# Patient Record
Sex: MALE | Race: WHITE | NOT HISPANIC OR LATINO | Employment: FULL TIME | URBAN - METROPOLITAN AREA
[De-identification: names, ages, dates, MRNs, and addresses within clinical notes are randomized per-mention and may not be internally consistent; named-entity substitution may affect disease eponyms.]

---

## 2017-01-05 ENCOUNTER — OFFICE VISIT (OUTPATIENT)
Dept: FAMILY MEDICINE CLINIC | Facility: CLINIC | Age: 32
End: 2017-01-05

## 2017-01-05 VITALS
OXYGEN SATURATION: 97 % | HEIGHT: 71 IN | WEIGHT: 240 LBS | HEART RATE: 93 BPM | TEMPERATURE: 98.8 F | BODY MASS INDEX: 33.6 KG/M2 | SYSTOLIC BLOOD PRESSURE: 130 MMHG | DIASTOLIC BLOOD PRESSURE: 72 MMHG

## 2017-01-05 DIAGNOSIS — F32.9 REACTIVE DEPRESSION: ICD-10-CM

## 2017-01-05 DIAGNOSIS — G47.00 INSOMNIA, UNSPECIFIED TYPE: Primary | ICD-10-CM

## 2017-01-05 DIAGNOSIS — L30.9 DERMATITIS: ICD-10-CM

## 2017-01-05 PROCEDURE — 99213 OFFICE O/P EST LOW 20 MIN: CPT | Performed by: FAMILY MEDICINE

## 2017-01-05 RX ORDER — ESCITALOPRAM OXALATE 10 MG/1
10 TABLET ORAL NIGHTLY
Qty: 30 TABLET | Refills: 5 | Status: SHIPPED | OUTPATIENT
Start: 2017-01-05 | End: 2017-07-01 | Stop reason: SDUPTHER

## 2017-01-05 NOTE — PROGRESS NOTES
"Subjective   Olivier Trevino is a 31 y.o. male.     History of Present Illness   The patient is here for a follow up on Depression and Insomnia.    He states he is doing well.  Denies any chest pain or shortness of breath.    He states he used the Diazepam for about three nights and stopped that.  He states he did sleep better with the Escitalopram.    States he has a new job. A better job but still very stressful.    Also c/o a small area of redness on the right side of the penis when he was taking a shower.      The following portions of the patient's history were reviewed and updated as appropriate: allergies, current medications, past social history and problem list.    Review of Systems   Constitutional: Positive for fatigue. Negative for chills and fever.   HENT: Negative for congestion.    Eyes: Positive for visual disturbance (floaters).   Respiratory: Negative for shortness of breath.    Cardiovascular: Negative for chest pain.   Gastrointestinal: Negative for abdominal pain, constipation, diarrhea and nausea.   Endocrine: Negative for cold intolerance, heat intolerance, polyphagia and polyuria.   Genitourinary: Negative for difficulty urinating, discharge, frequency, penile pain and penile swelling.   Skin:        Small area of redness of the right side of the penis.   Neurological: Negative for headaches.   Psychiatric/Behavioral: Positive for sleep disturbance (insomnia).        Depression.       Objective   Visit Vitals   • /72   • Pulse 93   • Temp 98.8 °F (37.1 °C)   • Ht 71\" (180.3 cm)   • Wt 240 lb (109 kg)   • SpO2 97%   • BMI 33.47 kg/m2     Physical Exam   Constitutional: He appears well-developed and well-nourished.   Cardiovascular: Normal rate, regular rhythm and normal heart sounds.    Pulmonary/Chest: Effort normal and breath sounds normal.   Genitourinary:   Genitourinary Comments: 1 mm papule of penile shaft consistent with inflamed epithelial cyst.   Nursing note and vitals " reviewed.      Assessment/Plan   Problem List Items Addressed This Visit     None      Visit Diagnoses     Insomnia, unspecified type    -  Primary    Reactive depression        Relevant Medications    escitalopram (LEXAPRO) 10 MG tablet    Dermatitis                  Drink plenty fluids.    Refill Escitalopram 10 mg each night #30+5.    Use OTC Lotrimin cream as needed.    Follow up in 6 months for a physical exam.                        Scribed for Dr Timothy Rodriguez by Lexii Marion CMA.    I, Timothy Rodriguez MD, personally performed the services described in this documentation, as scribed by Lexii Marion in my presence, and is both accurate and complete.

## 2017-01-05 NOTE — MR AVS SNAPSHOT
Olivier Trevino   2017 3:00 PM   Office Visit    Provider:  Timothy Rodriguez MD   Department:  [facility]   Dept Phone:  [phone]                Your Full Care Plan              Today's Medication Changes          These changes are accurate as of: 17  3:48 PM.  If you have any questions, ask your nurse or doctor.               Stop taking medication(s)listed here:     diazePAM 5 MG tablet   Commonly known as:  VALIUM   Stopped by:  Timothy Rodriguez MD           escitalopram 10 MG tablet   Commonly known as:  LEXAPRO   Stopped by:  Timothy Rodriguez MD           ranitidine 300 MG capsule   Commonly known as:  ZANTAC   Stopped by:  Timothy Rodriguez MD                      Your Updated Medication List          This list is accurate as of: 17  3:48 PM.  Always use your most recent med list.                aspirin 81 MG tablet               You Were Diagnosed With        Codes Comments    Insomnia, unspecified type    -  Primary ICD-10-CM: G47.00  ICD-9-CM: 780.52       Instructions     None    Patient Instructions History      [employer] Signup     [facility] [employer] allows you to send messages to your doctor, view your test results, renew your prescriptions, schedule appointments, and more. To sign up, go to [employer] and click on the Sign Up Now link in the New User? box. Enter your [employer] Activation Code exactly as it appears below along with the last four digits of your Social Security Number and your Date of Birth () to complete the sign-up process. If you do not sign up before the expiration date, you must request a new code.    [employer] Activation Code: [email or web address]  Expires: 2017  3:48 PM    If you have questions, you can email [email or web address] or call [phone] to talk to our [employer] staff. Remember, [employer] is NOT to be used for urgent needs. For medical emergencies, dial 911.               Other  "Info from Your Visit           Allergies     No Known Allergies      Reason for Visit     Follow-up           Vital Signs     Blood Pressure Pulse Temperature Height Weight Oxygen Saturation    130/72 93 98.8 °F (37.1 °C) 71\" (180.3 cm) 240 lb (109 kg) 97%    Body Mass Index Smoking Status                33.47 kg/m2 Former Smoker          Problems and Diagnoses Noted     Difficulty falling or staying asleep    -  Primary      "

## 2017-07-03 RX ORDER — ESCITALOPRAM OXALATE 10 MG/1
TABLET ORAL
Qty: 30 TABLET | Refills: 2 | Status: SHIPPED | OUTPATIENT
Start: 2017-07-03 | End: 2017-10-02 | Stop reason: SDUPTHER

## 2017-10-02 RX ORDER — ESCITALOPRAM OXALATE 10 MG/1
TABLET ORAL
Qty: 30 TABLET | Refills: 2 | Status: SHIPPED | OUTPATIENT
Start: 2017-10-02 | End: 2018-01-05 | Stop reason: SDUPTHER

## 2018-01-05 RX ORDER — ESCITALOPRAM OXALATE 10 MG/1
TABLET ORAL
Qty: 30 TABLET | Refills: 2 | Status: SHIPPED | OUTPATIENT
Start: 2018-01-05 | End: 2018-04-09 | Stop reason: SDUPTHER

## 2018-04-11 RX ORDER — ESCITALOPRAM OXALATE 10 MG/1
TABLET ORAL
Qty: 30 TABLET | Refills: 2 | Status: SHIPPED | OUTPATIENT
Start: 2018-04-11 | End: 2019-01-19 | Stop reason: SDUPTHER

## 2019-01-20 RX ORDER — ESCITALOPRAM OXALATE 10 MG/1
TABLET ORAL
Qty: 30 TABLET | Refills: 5 | Status: SHIPPED | OUTPATIENT
Start: 2019-01-20 | End: 2019-08-12 | Stop reason: SDUPTHER

## 2019-08-12 RX ORDER — ESCITALOPRAM OXALATE 10 MG/1
TABLET ORAL
Qty: 30 TABLET | Refills: 0 | Status: SHIPPED | OUTPATIENT
Start: 2019-08-12 | End: 2019-08-19 | Stop reason: SDUPTHER

## 2019-08-19 RX ORDER — ESCITALOPRAM OXALATE 10 MG/1
TABLET ORAL
Qty: 30 TABLET | Refills: 0 | Status: SHIPPED | OUTPATIENT
Start: 2019-08-19 | End: 2023-02-21

## 2023-02-21 ENCOUNTER — OFFICE VISIT (OUTPATIENT)
Dept: FAMILY MEDICINE CLINIC | Facility: CLINIC | Age: 38
End: 2023-02-21
Payer: COMMERCIAL

## 2023-02-21 VITALS
TEMPERATURE: 97.5 F | HEART RATE: 87 BPM | DIASTOLIC BLOOD PRESSURE: 98 MMHG | WEIGHT: 233.6 LBS | SYSTOLIC BLOOD PRESSURE: 132 MMHG | BODY MASS INDEX: 33.44 KG/M2 | HEIGHT: 70 IN | OXYGEN SATURATION: 97 %

## 2023-02-21 DIAGNOSIS — J01.10 ACUTE NON-RECURRENT FRONTAL SINUSITIS: Primary | ICD-10-CM

## 2023-02-21 PROCEDURE — 99203 OFFICE O/P NEW LOW 30 MIN: CPT | Performed by: PHYSICIAN ASSISTANT

## 2023-02-21 RX ORDER — METHYLPREDNISOLONE 4 MG/1
TABLET ORAL
Qty: 21 TABLET | Refills: 0 | Status: SHIPPED | OUTPATIENT
Start: 2023-02-21

## 2023-02-21 RX ORDER — AMOXICILLIN AND CLAVULANATE POTASSIUM 875; 125 MG/1; MG/1
1 TABLET, FILM COATED ORAL 2 TIMES DAILY
Qty: 20 TABLET | Refills: 0 | Status: SHIPPED | OUTPATIENT
Start: 2023-02-21

## 2023-11-05 ENCOUNTER — HOSPITAL ENCOUNTER (EMERGENCY)
Facility: HOSPITAL | Age: 38
Discharge: HOME | End: 2023-11-05
Attending: EMERGENCY MEDICINE
Payer: COMMERCIAL

## 2023-11-05 VITALS
WEIGHT: 240 LBS | BODY MASS INDEX: 34.36 KG/M2 | RESPIRATION RATE: 18 BRPM | HEIGHT: 70 IN | HEART RATE: 100 BPM | SYSTOLIC BLOOD PRESSURE: 133 MMHG | OXYGEN SATURATION: 94 % | TEMPERATURE: 98 F | DIASTOLIC BLOOD PRESSURE: 84 MMHG

## 2023-11-05 DIAGNOSIS — R04.0 BLEEDING FROM THE NOSE: Primary | ICD-10-CM

## 2023-11-05 PROCEDURE — 99283 EMERGENCY DEPT VISIT LOW MDM: CPT

## 2023-11-05 RX ORDER — OXYMETAZOLINE HYDROCHLORIDE 0.05 G/100ML
1 SPRAY NASAL ONCE
Status: COMPLETED | OUTPATIENT
Start: 2023-11-05 | End: 2023-11-05

## 2023-11-05 RX ADMIN — OXYMETAZOLINE HCL 1 SPRAY: 0.05 SPRAY NASAL at 19:28

## 2023-11-06 NOTE — ED PROVIDER NOTES
"Subjective  History of Present Illness:    Chief Complaint: Nosebleed  History of Present Illness: 89-year-old male with nosebleed, he states that he has had congestion, sinus pain and pressure, and use of over-the-counter nasal spray, he began to have a nosebleed approximately 40 minutes prior to arrival from his left nostril  Onset: Sudden onset  Duration: Smillie 40 minutes prior to arrival  Exacerbating / Alleviating factors: Recent sinus congestion and pressure used a nasal spray  Associated symptoms: No other associated symptoms      Nurses Notes reviewed and agree, including vitals, allergies, social history and prior medical history.     REVIEW OF SYSTEMS: All systems reviewed and not pertinent unless noted.    Review of Systems   HENT:  Positive for nosebleeds.    All other systems reviewed and are negative.      Past Medical History:   Diagnosis Date    Geographic tongue        Allergies:    Patient has no known allergies.      History reviewed. No pertinent surgical history.      Social History     Socioeconomic History    Marital status: Single   Tobacco Use    Smoking status: Some Days     Packs/day: 0.50     Years: 5.00     Additional pack years: 0.00     Total pack years: 2.50     Types: Cigarettes     Passive exposure: Never    Smokeless tobacco: Never   Substance and Sexual Activity    Alcohol use: Yes     Alcohol/week: 10.0 standard drinks of alcohol     Types: 10 Cans of beer per week     Comment: week    Drug use: Yes     Types: Marijuana     Comment: once in a while    Sexual activity: Never         Family History   Problem Relation Age of Onset    Diabetes Mother     Coronary artery disease Father     Diabetes Maternal Grandfather     Coronary artery disease Paternal Grandfather        Objective  Physical Exam:  /84   Pulse 100   Temp 98 °F (36.7 °C) (Oral)   Resp 18   Ht 177.8 cm (70\")   Wt 109 kg (240 lb)   SpO2 94%   BMI 34.44 kg/m²      Physical Exam  Vitals and nursing note " reviewed.   Constitutional:       Appearance: He is well-developed.   HENT:      Head: Normocephalic and atraumatic.      Nose:      Comments: Bleeding from the anterior mucosal left nostril     Mouth/Throat:      Mouth: Mucous membranes are moist.   Eyes:      Extraocular Movements: Extraocular movements intact.   Cardiovascular:      Rate and Rhythm: Normal rate and regular rhythm.   Pulmonary:      Effort: Pulmonary effort is normal.      Breath sounds: Normal breath sounds.   Abdominal:      Palpations: Abdomen is soft.   Musculoskeletal:         General: Normal range of motion.      Cervical back: Normal range of motion.   Skin:     General: Skin is warm and dry.   Neurological:      Mental Status: He is alert and oriented to person, place, and time.   Psychiatric:         Behavior: Behavior normal.         Thought Content: Thought content normal.         Judgment: Judgment normal.           Epistaxis Management    Date/Time: 11/5/2023 8:50 PM    Performed by: Remy Gastelum Jr., PA-C  Authorized by: Olivier Gallegos MD    Consent:     Consent obtained:  Verbal    Consent given by:  Patient    Risks discussed:  Bleeding    Alternatives discussed:  No treatment  Universal protocol:     Patient identity confirmed:  Verbally with patient  Anesthesia:     Anesthesia method:  None  Procedure details:     Treatment site:  L anterior    Treatment complexity:  Limited    Treatment episode: initial    Post-procedure details:     Assessment:  Bleeding stopped    Procedure completion:  Tolerated  Comments:      Afrin nasal spray and direct pressure stop the bleeding      ED Course:    ED Course as of 11/05/23 2052   Sun Nov 05, 2023 1958 Patient's nosebleed has resolved, he was given instruction on Afrin spray, and moisturizing the mucosa with a topical generic antibiotic ointment and Q-tip a few times a day, return if symptoms worsen [CS]      ED Course User Index  [CS] Remy Gastelum Jr., PA-C       Lab  Results (last 24 hours)       ** No results found for the last 24 hours. **             No radiology results from the last 24 hrs       Medical Decision Making  Patient Presentation 37-year-old male presented with nosebleed, vital signs stable, slight elevation in his blood pressure initially likely from the nosebleed    DDX anterior nosebleed, posterior nosebleed, trauma, injury    Data Review/ Non ED Records /Analysis/Ordering unique tests no labs performed, no pertinent data review    Independent Review Studies no studies performed    Intervention/Re-evaluation intervention included removing clot from nose, Afrin spray and pressure    Independent Clinician consultation    Risk Stratification tools/clinical decision rules patient was educated on moisturizing nasal passages with topical ointment, he can continue the Afrin spray for a few days, and pressure if bleeding returns, he was stable on discharge, no acute distress    Shared Decision Making discussed this plan with the patient and he agreed with the treatment    Disposition patient stable for discharge    Problems Addressed:  Bleeding from the nose: acute illness or injury    Risk  OTC drugs.          Final diagnoses:   Bleeding from the nose          Remy Gastelum Jr., PA-C  11/05/23 2052       Remy Gastelum Jr., PA-C  11/10/23 1848

## 2023-11-07 ENCOUNTER — OFFICE VISIT (OUTPATIENT)
Dept: FAMILY MEDICINE CLINIC | Facility: CLINIC | Age: 38
End: 2023-11-07
Payer: COMMERCIAL

## 2023-11-07 VITALS
BODY MASS INDEX: 31.81 KG/M2 | OXYGEN SATURATION: 98 % | WEIGHT: 222.2 LBS | HEART RATE: 82 BPM | DIASTOLIC BLOOD PRESSURE: 64 MMHG | HEIGHT: 70 IN | TEMPERATURE: 98.6 F | SYSTOLIC BLOOD PRESSURE: 124 MMHG

## 2023-11-07 DIAGNOSIS — R04.0 EPISTAXIS: Primary | ICD-10-CM

## 2023-11-07 PROCEDURE — 99213 OFFICE O/P EST LOW 20 MIN: CPT | Performed by: PHYSICIAN ASSISTANT

## 2023-11-07 NOTE — PROGRESS NOTES
Follow Up Office Visit      Date: 2023   Patient Name: Olivier Trevino  : 1985   MRN: [id number]     Chief Complaint:    Chief Complaint   Patient presents with    Nose Bleed     3 days    Pt states that he thought he had a cold, and he was using a nasal spray. He states that he stated to feel alittle better.  He went to the urgent care and they had him blow it all out his nose they gave him another spray to use it helped the irration.  Pt then noticed today that it had started bleeding again and it was a lot       History of Present Illness: Olivier Trevino is a 38 y.o. male who is here today to follow up with nosebleeds.      Olivier Trevino date of birth 1985. Presents in clinic today for nosebleeds.     The patient reports that on 2023 he felt fatigued. He reports that he worked from home on 2023 he reports that he began pumping Zicam, following regiment. The patient reports that on 2023 in the afternoon his symptoms improved. He reports that he ran errands and around 6pm he began getting consistent epistaxis. The patient reports that he would pinch his nose for 30 minutes and the blood continued to pour out of his nose. He reports that he has gone to the emergency department. He reports having phlegm and copious amounts of blood while blowing his nose. The patient reports that he was prescribed a nasal medication to use once daily. And was informed that the Zicam irritated his sinuses. The patient reports that he will have bleeding out of his left side. The patient reports that he has not spit up any blood. He reports that he can feel it run down his throat. The patient reports that a week prior he felt like his nose bleeds began to resolve. The patient reports that his blood pressure has been stable and in healthy range. Discussed with the patient about moisturizing with Vaseline or Aquaphor to protect the mucosa.    Subjective      Review of systems:  Review of Systems  "  Constitutional:  Negative for fatigue and fever.   HENT:  Negative for trouble swallowing.    Eyes:  Negative for visual disturbance.   Respiratory:  Negative for cough and shortness of breath.    Cardiovascular:  Negative for chest pain and leg swelling.   Gastrointestinal:  Negative for abdominal pain.        I have reviewed and the following portions of the patient's history were updated as appropriate: past family history, past medical history, past social history, past surgical history and problem list.    Medications:     Current Outpatient Medications:     aspirin 81 MG tablet, Take 1 tablet by mouth., Disp: , Rfl:     Allergies:   No Known Allergies    Objective     Vital Signs:   Vitals:    11/07/23 1523   BP: 124/64   Pulse: 82   Temp: 98.6 °F (37 °C)   TempSrc: Infrared   SpO2: 98%   Weight: 101 kg (222 lb 3.2 oz)   Height: 177.8 cm (70\")   PainSc: 0-No pain     Body mass index is 31.88 kg/m².          Physical Exam:   Physical Exam  Vitals and nursing note reviewed.   Constitutional:       Appearance: Normal appearance.   HENT:      Head: Normocephalic and atraumatic.      Right Ear: Tympanic membrane and ear canal normal.      Left Ear: Tympanic membrane and ear canal normal.      Nose: Mucosal edema present. No congestion or rhinorrhea.      Right Nostril: No epistaxis.      Left Nostril: No epistaxis.      Comments: No polyps or lesions noted     Mouth/Throat:      Mouth: Mucous membranes are moist.      Pharynx: Oropharynx is clear. No posterior oropharyngeal erythema.   Cardiovascular:      Rate and Rhythm: Normal rate and regular rhythm.   Pulmonary:      Effort: Pulmonary effort is normal.      Breath sounds: Normal breath sounds. No decreased breath sounds, wheezing, rhonchi or rales.   Musculoskeletal:      Cervical back: Neck supple.      Right lower leg: No edema.      Left lower leg: No edema.   Lymphadenopathy:      Cervical: No cervical adenopathy.   Neurological:      Mental Status: He " is alert.          Procedures     Assessment / Plan      Assessment/Plan:   Diagnoses and all orders for this visit:    1. Epistaxis (Primary)       Plan, ocean spray, nasal saline for moisturization humidifier or cool mist vaporizer for moisture in the air, may use a false small amount of Vaseline to coat the anterior nasal mucosa to help with this. If he has any further bleeding, he will let me know.    Follow Up:   No follow-ups on file.    Frances Galeano PA-C   [facility] [place]  Transcribed from ambient dictation for Frances Galeano PA-C by Kassidy Ramirez.  11/07/23   19:00 EST    Patient or patient representative verbalized consent to the visit recording.  I have personally performed the services described in this document as transcribed by the above individual, and it is both accurate and complete.

## 2024-08-16 ENCOUNTER — LAB (OUTPATIENT)
Dept: LAB | Facility: HOSPITAL | Age: 39
End: 2024-08-16
Payer: COMMERCIAL

## 2024-08-16 ENCOUNTER — OFFICE VISIT (OUTPATIENT)
Dept: FAMILY MEDICINE CLINIC | Facility: CLINIC | Age: 39
End: 2024-08-16
Payer: COMMERCIAL

## 2024-08-16 VITALS
BODY MASS INDEX: 32.51 KG/M2 | TEMPERATURE: 97.8 F | HEIGHT: 70 IN | WEIGHT: 227.07 LBS | DIASTOLIC BLOOD PRESSURE: 100 MMHG | OXYGEN SATURATION: 98 % | HEART RATE: 110 BPM | SYSTOLIC BLOOD PRESSURE: 160 MMHG

## 2024-08-16 DIAGNOSIS — F41.0 PANIC ATTACK: ICD-10-CM

## 2024-08-16 DIAGNOSIS — Z00.00 ANNUAL PHYSICAL EXAM: Primary | ICD-10-CM

## 2024-08-16 DIAGNOSIS — R03.0 ELEVATED BP WITHOUT DIAGNOSIS OF HYPERTENSION: ICD-10-CM

## 2024-08-16 DIAGNOSIS — E66.09 CLASS 1 OBESITY DUE TO EXCESS CALORIES WITHOUT SERIOUS COMORBIDITY WITH BODY MASS INDEX (BMI) OF 32.0 TO 32.9 IN ADULT: ICD-10-CM

## 2024-08-16 LAB — HBA1C MFR BLD: 5.7 % (ref 4.8–5.6)

## 2024-08-16 PROCEDURE — 83036 HEMOGLOBIN GLYCOSYLATED A1C: CPT

## 2024-08-16 PROCEDURE — 80050 GENERAL HEALTH PANEL: CPT

## 2024-08-16 PROCEDURE — 84439 ASSAY OF FREE THYROXINE: CPT

## 2024-08-16 PROCEDURE — 36415 COLL VENOUS BLD VENIPUNCTURE: CPT

## 2024-08-16 PROCEDURE — 82607 VITAMIN B-12: CPT

## 2024-08-16 PROCEDURE — 99396 PREV VISIT EST AGE 40-64: CPT | Performed by: PHYSICIAN ASSISTANT

## 2024-08-16 PROCEDURE — 93000 ELECTROCARDIOGRAM COMPLETE: CPT | Performed by: PHYSICIAN ASSISTANT

## 2024-08-16 RX ORDER — DIPHENHYDRAMINE HCL 25 MG
25 CAPSULE ORAL EVERY 6 HOURS PRN
COMMUNITY

## 2024-08-16 RX ORDER — HYDROXYZINE HYDROCHLORIDE 25 MG/1
TABLET, FILM COATED ORAL
Qty: 60 TABLET | Refills: 5 | Status: SHIPPED | OUTPATIENT
Start: 2024-08-16

## 2024-08-16 NOTE — PROGRESS NOTES
Follow Up Office Visit    Date: 2024   Patient Name: Olivier Trevino  : 1985   MRN: [id number]     Chief Complaint:    Chief Complaint   Patient presents with    Nasal Congestion     Draining down  Pt is having lot of mucus to blow out of his nose    Stress     Pt states that he had a panic attack at work and states that he has even noticed more acne and flushed faced  Pt states that is having some issue with the grieving of his father and he isnt sleeping well, he has to take a benadryl to sleep       History of Present Illness:   Olivier Trevino is a 38 y.o. male.  History of Present Illness  The patient presents for evaluation of panic attacks.    He has been experiencing panic attacks, facial flushing, and sleep disturbances. These symptoms began after the death of his father from pancreatic cancer on 2024 and the subsequent stress of dealing with a new boss. He recalls an incident where he froze and panicked during an important meeting, which was noticed by a colleague who observed his flushed face. He denies any vision disturbances during his panic attacks but reports a sensation of blood rushing to his head and eyes. He also experiences racing thoughts and sharp pain in his left arm, but is unsure if these are physical symptoms or manifestations of his anxiety. His EKG results were inconclusive. He has a history of anxiety and his blood pressure was found to be elevated, although not to a dangerous level.    He struggles with sleep, often waking up 2 to 3 times a night despite taking Benadryl. He consumes about 3 sodas daily and enjoys beer, drinking 20 to 24 bottles a week. He experiences occasional coughing fits, which he suspects may be stress-induced. Despite feeling the need to clear his nose, he finds little to no discharge when he does so. He has inconsistent bowel movements and is considering adding Metamucil to his diet.     He has previously tried citalopram for his anxiety but did  "not find it helpful.    FAMILY HISTORY  His father had bypass surgery at the age of 54 and was on medication. His grandfather had heart issues due to rheumatic fever.        Subjective    Review of systems:  Review of Systems     I have reviewed and the following portions of the patient's history were updated as appropriate: past family history, past medical history, past social history, past surgical history and problem list.    Medications:     Current Outpatient Medications:     diphenhydrAMINE (BENADRYL) 25 mg capsule, Take 1 capsule by mouth Every 6 (Six) Hours As Needed for Sleep. At bed time, Disp: , Rfl:     aspirin 81 MG tablet, Take 1 tablet by mouth. (Patient not taking: Reported on 8/16/2024), Disp: , Rfl:     hydrOXYzine (ATARAX) 25 MG tablet, 1-2 po qhs for sleep, Disp: 60 tablet, Rfl: 5    sertraline (Zoloft) 50 MG tablet, Take 1 tablet by mouth Daily., Disp: 30 tablet, Rfl: 5    Allergies:   No Known Allergies    Objective   Vital Signs:   Vitals:    08/16/24 1353   BP: 160/100   Pulse: 110   Temp: 97.8 °F (36.6 °C)   TempSrc: Infrared   SpO2: 98%   Weight: 103 kg (227 lb 1.2 oz)   Height: 177.8 cm (70\")   PainSc: 0-No pain     Body mass index is 32.58 kg/m².          Physical Exam:   Physical Exam  Vitals and nursing note reviewed.   Constitutional:       General: He is not in acute distress.     Appearance: Normal appearance. He is well-developed.   HENT:      Head: Normocephalic and atraumatic.      Right Ear: Tympanic membrane and ear canal normal. There is no impacted cerumen.      Left Ear: Tympanic membrane and ear canal normal. There is no impacted cerumen.      Nose: Nose normal. No congestion or rhinorrhea.      Mouth/Throat:      Mouth: Mucous membranes are moist.      Pharynx: Oropharynx is clear. No oropharyngeal exudate or posterior oropharyngeal erythema.   Eyes:      General: No scleral icterus.        Right eye: No discharge.         Left eye: No discharge.      Extraocular " Movements: Extraocular movements intact.      Conjunctiva/sclera: Conjunctivae normal.      Pupils: Pupils are equal, round, and reactive to light.   Neck:      Thyroid: No thyromegaly.      Vascular: No carotid bruit.   Cardiovascular:      Rate and Rhythm: Normal rate and regular rhythm.      Heart sounds: Normal heart sounds. No murmur heard.  Pulmonary:      Breath sounds: Normal breath sounds. No wheezing, rhonchi or rales.   Abdominal:      General: Bowel sounds are normal. There is no distension.      Palpations: Abdomen is soft. There is no mass.      Tenderness: There is no abdominal tenderness.   Musculoskeletal:         General: No swelling. Normal range of motion.      Cervical back: Normal range of motion and neck supple.      Right lower leg: No edema.      Left lower leg: No edema.   Lymphadenopathy:      Cervical: No cervical adenopathy.   Skin:     General: Skin is warm.      Coloration: Skin is not jaundiced or pale.      Findings: No bruising or rash.   Neurological:      General: No focal deficit present.      Mental Status: He is alert.      Cranial Nerves: No cranial nerve deficit.      Motor: No weakness.      Gait: Gait normal.   Psychiatric:         Mood and Affect: Mood normal.         Behavior: Behavior normal.         Judgment: Judgment normal.            ECG 12 Lead    Date/Time: 8/16/2024 2:29 PM  Performed by: Frances Galeano PA-C    Authorized by: Frances Galeano PA-C  Comparison: compared with previous ECG from 2/18/2015  Similar to previous ECG  Rhythm: sinus rhythm  Rate: normal  QRS axis: normal    Clinical impression: normal ECG           Assessment / Plan    Assessment/Plan:   Diagnoses and all orders for this visit:    1. Annual physical exam (Primary)    2. Panic attack  -     ECG 12 Lead  -     hydrOXYzine (ATARAX) 25 MG tablet; 1-2 po qhs for sleep  Dispense: 60 tablet; Refill: 5  -     sertraline (Zoloft) 50 MG tablet; Take 1 tablet by mouth Daily.  Dispense: 30  tablet; Refill: 5    3. Elevated BP without diagnosis of hypertension  -     T4, free; Future  -     Comprehensive metabolic panel; Future  -     CBC No Differential; Future  -     TSH; Future  -     Vitamin B12; Future    4. Class 1 obesity due to excess calories without serious comorbidity with body mass index (BMI) of 32.0 to 32.9 in adult  -     Hemoglobin A1c; Future       Assessment & Plan  1. Panic Disorder.  Symptoms of panic attacks, facial flushing, and sleep disturbances are indicative of anxiety. Elevated blood pressure could be a result of this anxiety. Sertraline 50 mg daily is prescribed for panic disorder. Another medication for sleep and anxiety will be prescribed to be taken at night. Potential side effects, including lightheadedness and mild nausea, were discussed. He is advised to monitor his blood pressure regularly.    2. Elevated Blood Pressure.  The elevated blood pressure may be related to anxiety. He is advised to monitor his blood pressure regularly. If anxiety is controlled, blood pressure may decrease. Reducing caffeine intake and limiting soda consumption to 1 or 2 cans per day is recommended. A reduction in alcohol consumption is also suggested.    3. Coughing Fits.  Coughing fits may be associated with acid reflux or heartburn. Monitoring symptoms and considering lifestyle changes, such as dietary adjustments, are recommended.    Follow-up  A follow-up appointment is scheduled in 4 weeks.      Follow Up:   Return in about 4 weeks (around 9/13/2024).    Patient or patient representative verbalized consent for the use of Ambient Listening during the visit with  Frances Galeano PA-C for chart documentation. 8/29/2024  18:18 EDT    Frances Galeano PA-C   [facility] [place]

## 2024-08-17 LAB
ALBUMIN SERPL-MCNC: 4.6 G/DL (ref 3.5–5.2)
ALBUMIN/GLOB SERPL: 1.5 G/DL
ALP SERPL-CCNC: 64 U/L (ref 39–117)
ALT SERPL W P-5'-P-CCNC: 45 U/L (ref 1–41)
ANION GAP SERPL CALCULATED.3IONS-SCNC: 12 MMOL/L (ref 5–15)
AST SERPL-CCNC: 34 U/L (ref 1–40)
BILIRUB SERPL-MCNC: 0.9 MG/DL (ref 0–1.2)
BUN SERPL-MCNC: 10 MG/DL (ref 6–20)
BUN/CREAT SERPL: 10.4 (ref 7–25)
CALCIUM SPEC-SCNC: 9.5 MG/DL (ref 8.6–10.5)
CHLORIDE SERPL-SCNC: 94 MMOL/L (ref 98–107)
CO2 SERPL-SCNC: 25 MMOL/L (ref 22–29)
CREAT SERPL-MCNC: 0.96 MG/DL (ref 0.76–1.27)
DEPRECATED RDW RBC AUTO: 38.2 FL (ref 37–54)
EGFRCR SERPLBLD CKD-EPI 2021: 102.5 ML/MIN/1.73
ERYTHROCYTE [DISTWIDTH] IN BLOOD BY AUTOMATED COUNT: 11.8 % (ref 12.3–15.4)
GLOBULIN UR ELPH-MCNC: 3 GM/DL
GLUCOSE SERPL-MCNC: 87 MG/DL (ref 65–99)
HCT VFR BLD AUTO: 42.6 % (ref 37.5–51)
HGB BLD-MCNC: 14.6 G/DL (ref 13–17.7)
MCH RBC QN AUTO: 30.7 PG (ref 26.6–33)
MCHC RBC AUTO-ENTMCNC: 34.3 G/DL (ref 31.5–35.7)
MCV RBC AUTO: 89.5 FL (ref 79–97)
PLATELET # BLD AUTO: 192 10*3/MM3 (ref 140–450)
PMV BLD AUTO: 10.9 FL (ref 6–12)
POTASSIUM SERPL-SCNC: 4.6 MMOL/L (ref 3.5–5.2)
PROT SERPL-MCNC: 7.6 G/DL (ref 6–8.5)
RBC # BLD AUTO: 4.76 10*6/MM3 (ref 4.14–5.8)
SODIUM SERPL-SCNC: 131 MMOL/L (ref 136–145)
T4 FREE SERPL-MCNC: 1.51 NG/DL (ref 0.92–1.68)
TSH SERPL DL<=0.05 MIU/L-ACNC: 1.19 UIU/ML (ref 0.27–4.2)
VIT B12 BLD-MCNC: 370 PG/ML (ref 211–946)
WBC NRBC COR # BLD AUTO: 8.7 10*3/MM3 (ref 3.4–10.8)

## 2024-09-13 ENCOUNTER — OFFICE VISIT (OUTPATIENT)
Dept: FAMILY MEDICINE CLINIC | Facility: CLINIC | Age: 39
End: 2024-09-13
Payer: COMMERCIAL

## 2024-09-13 VITALS
SYSTOLIC BLOOD PRESSURE: 124 MMHG | BODY MASS INDEX: 32.87 KG/M2 | HEIGHT: 70 IN | OXYGEN SATURATION: 99 % | WEIGHT: 229.6 LBS | TEMPERATURE: 97.7 F | DIASTOLIC BLOOD PRESSURE: 82 MMHG | HEART RATE: 97 BPM

## 2024-09-13 DIAGNOSIS — F41.0 PANIC ATTACK: Primary | ICD-10-CM

## 2024-09-13 DIAGNOSIS — F41.9 ANXIETY: ICD-10-CM

## 2024-09-13 PROCEDURE — 99213 OFFICE O/P EST LOW 20 MIN: CPT | Performed by: PHYSICIAN ASSISTANT

## 2024-09-13 NOTE — PROGRESS NOTES
"     Follow Up Office Visit    Date: 2024   Patient Name: Olivier Trevino  : 1985   MRN: [id number]     Chief Complaint:    Chief Complaint   Patient presents with    Insomnia    Hypertension       History of Present Illness:   Olivier Trevino is a 38 y.o. male.  History of Present Illness  The patient presents for evaluation of his blood pressure.    He reports an improvement in his blood pressure, attributing it to the medications prescribed during his last visit. He also mentions a significant improvement in his sleep quality, which he believes has contributed to his overall well-being. He has not experienced any panic attacks recently and feels more resilient to stress. He denies experiencing any side effects from his medications. He has made dietary changes, including reducing his caffeine and beer intake, and has increased his water consumption.        Subjective    Review of systems:  Review of Systems     I have reviewed and the following portions of the patient's history were updated as appropriate: past family history, past medical history, past social history, past surgical history and problem list.    Medications:     Current Outpatient Medications:     hydrOXYzine (ATARAX) 25 MG tablet, 1-2 po qhs for sleep, Disp: 60 tablet, Rfl: 5    sertraline (Zoloft) 50 MG tablet, Take 1 tablet by mouth Daily., Disp: 30 tablet, Rfl: 5    aspirin 81 MG tablet, Take 1 tablet by mouth. (Patient not taking: Reported on 2024), Disp: , Rfl:     diphenhydrAMINE (BENADRYL) 25 mg capsule, Take 1 capsule by mouth Every 6 (Six) Hours As Needed for Sleep. At bed time (Patient not taking: Reported on 2024), Disp: , Rfl:     Allergies:   No Known Allergies    Objective   Vital Signs:   Vitals:    24 0721   BP: 124/82   Pulse: 97   Temp: 97.7 °F (36.5 °C)   TempSrc: Temporal   SpO2: 99%   Weight: 104 kg (229 lb 9.6 oz)   Height: 177.8 cm (70\")   PainSc: 0-No pain     Body mass index is 32.94 kg/m².    "       Physical Exam:   Physical Exam  Vitals and nursing note reviewed.   Constitutional:       Appearance: Normal appearance.   Neurological:      Mental Status: He is alert.   Psychiatric:         Attention and Perception: Attention normal.         Mood and Affect: Mood and affect normal.         Speech: Speech normal.         Behavior: Behavior normal.          Procedures     Assessment / Plan    Assessment/Plan:   Diagnoses and all orders for this visit:    1. Panic attack (Primary)    2. Anxiety       Assessment & Plan  1. Hypertension.  Blood pressure readings have shown significant improvement. He was advised to maintain his current medication regimen and incorporate regular exercise into his routine. There is a slight decrease in sodium levels, potentially due to excessive water intake. All other parameters, including kidney, liver function, thyroid, and B12 levels, are within normal limits.    2. Panic Attacks.  He has not experienced any panic attacks since starting the new medications. He reports improved sleep quality and reduced knee-jerk reactions to stress. No side effects such as changes in appetite or nausea have been noted. He was advised to continue the current medication regimen, including the nighttime medication.    Follow-up  The patient will follow up in 3 to 4 months.      Follow Up:   Return in about 4 years (around 9/13/2028) for anxiety, BP, Recheck.    Patient or patient representative verbalized consent for the use of Ambient Listening during the visit with  Frances Galeano PA-C for chart documentation. 9/13/2024  10:05 EDT    Frances Galeano PA-C   [facility] [place]

## 2024-11-19 DIAGNOSIS — F41.0 PANIC ATTACK: ICD-10-CM

## 2025-01-22 ENCOUNTER — TELEPHONE (OUTPATIENT)
Dept: FAMILY MEDICINE CLINIC | Facility: CLINIC | Age: 40
End: 2025-01-22
Payer: COMMERCIAL

## 2025-01-22 NOTE — TELEPHONE ENCOUNTER
Caller: Olivier Trevino    Relationship: Self    Best call back number: [phone]     What medication are you requesting: AMOXICILLIN    What are your current symptoms: SINUS PRESSURE, CONGESTION, RUNNY NOSE    How long have you been experiencing symptoms: 3 DAYS    Have you had these symptoms before:    [] Yes  [x] No    Have you been treated for these symptoms before:   [] Yes  [x] No    If a prescription is needed, what is your preferred pharmacy and phone number: [id number] [address] AT [facility] [address] - [phone] [facility] [phone] FX     Additional notes:  PATIENT STATES SYMPTOMS STARTED THREE DAYS AGO, AND HE WOULD LIKE SOMETHING CALLED IN TO TREAT THE SYMPTOMS, AND GENERALLY AMOXICILLIN IS PRESCRIBED.  PLEASE CALL TO DISCUSS, AND IT IS OKAY TO LEAVE A MESSAGE.

## 2025-01-22 NOTE — TELEPHONE ENCOUNTER
Patient advised he would need appointment to be seen. Scheduled 1/23/25 at 0815 with Dr. Gonzáles.

## 2025-01-23 ENCOUNTER — OFFICE VISIT (OUTPATIENT)
Dept: FAMILY MEDICINE CLINIC | Facility: CLINIC | Age: 40
End: 2025-01-23
Payer: COMMERCIAL

## 2025-01-23 VITALS
BODY MASS INDEX: 33.87 KG/M2 | HEIGHT: 70 IN | SYSTOLIC BLOOD PRESSURE: 120 MMHG | WEIGHT: 236.6 LBS | HEART RATE: 114 BPM | TEMPERATURE: 98.6 F | RESPIRATION RATE: 20 BRPM | DIASTOLIC BLOOD PRESSURE: 88 MMHG | OXYGEN SATURATION: 97 %

## 2025-01-23 DIAGNOSIS — J01.00 ACUTE NON-RECURRENT MAXILLARY SINUSITIS: Primary | ICD-10-CM

## 2025-01-23 DIAGNOSIS — R05.9 COUGH, UNSPECIFIED TYPE: ICD-10-CM

## 2025-01-23 LAB
EXPIRATION DATE: NORMAL
EXPIRATION DATE: NORMAL
FLUAV AG NPH QL: NEGATIVE
FLUBV AG NPH QL: NEGATIVE
INTERNAL CONTROL: NORMAL
INTERNAL CONTROL: NORMAL
Lab: NORMAL
Lab: NORMAL
SARS-COV-2 AG UPPER RESP QL IA.RAPID: NOT DETECTED

## 2025-01-23 PROCEDURE — 87426 SARSCOV CORONAVIRUS AG IA: CPT | Performed by: HOSPITALIST

## 2025-01-23 PROCEDURE — 99214 OFFICE O/P EST MOD 30 MIN: CPT | Performed by: HOSPITALIST

## 2025-01-23 PROCEDURE — 87804 INFLUENZA ASSAY W/OPTIC: CPT | Performed by: HOSPITALIST

## 2025-01-23 RX ORDER — BENZONATATE 100 MG/1
100 CAPSULE ORAL 3 TIMES DAILY PRN
Qty: 30 CAPSULE | Refills: 0 | Status: SHIPPED | OUTPATIENT
Start: 2025-01-23

## 2025-01-23 NOTE — ASSESSMENT & PLAN NOTE
This is likely a viral infection that progressed to a sinus infection now.  Plan to treat with Augmentin 875/125 mg p.o. twice daily x 7 days.  Also prescribed Tessalon Perles for cough.  Advised the patient to return to the clinic if his symptoms do not improve in a few days.

## 2025-01-23 NOTE — PROGRESS NOTES
Follow Up Office Visit      Patient Name: Olivier Trevino  : 1985   MRN: [id number]     Chief Complaint:  Nasal Congestion and Cough (Since )     History of Present Illness: Olivier Trevino is a 39 y.o. male who is here today for acute complaint of sinus congestion with ongoing runny nose. He states his symptoms began . He states he has had so much drainage that he is now coughing nonstop. The patient states he is able to cough some stuff out in the mornings and at night but the cough is dry through the day. The patient states that he is coughing so much he is gagging. He denies fever. He has no sick contacts. Influenza and covid are negative. He denies ear pain. He states he has a lot of sinus pressure under his eyes and across his forehead.        Subjective     Subjective          The following portions of the patient's history were reviewed and updated as appropriate: allergies, current medications, past family history, past medical history, past social history, past surgical history and problem list.    Allergy:   No Known Allergies     Current Medications:   Current Outpatient Medications   Medication Sig Dispense Refill    aspirin 81 MG tablet Take 1 tablet by mouth.      hydrOXYzine (ATARAX) 25 MG tablet 1-2 po qhs for sleep 60 tablet 5    sertraline (ZOLOFT) 50 MG tablet TAKE 1 TABLET BY MOUTH DAILY 30 tablet 5    amoxicillin-clavulanate (AUGMENTIN) 875-125 MG per tablet Take 1 tablet by mouth 2 (Two) Times a Day. 14 tablet 0    benzonatate (Tessalon Perles) 100 MG capsule Take 1 capsule by mouth 3 (Three) Times a Day As Needed for Cough. 30 capsule 0     No current facility-administered medications for this visit.       Objective     Objective     Physical Exam:  Physical Exam  Vitals and nursing note reviewed.   Constitutional:       Appearance: Normal appearance.   HENT:      Head: Normocephalic and atraumatic.      Right Ear: Ear canal normal.      Left Ear: Ear canal normal.       "Ears:      Comments: Clear bulging fluid behind eardrums b/l       Nose: Congestion present.      Right Sinus: Maxillary sinus tenderness and frontal sinus tenderness present.      Left Sinus: Maxillary sinus tenderness and frontal sinus tenderness present.      Mouth/Throat:      Mouth: Mucous membranes are moist.      Pharynx: Postnasal drip present.   Eyes:      Pupils: Pupils are equal, round, and reactive to light.   Cardiovascular:      Rate and Rhythm: Normal rate and regular rhythm.      Heart sounds: Normal heart sounds.   Pulmonary:      Effort: Pulmonary effort is normal.      Breath sounds: Normal breath sounds.   Abdominal:      General: Bowel sounds are normal.      Palpations: Abdomen is soft.   Musculoskeletal:         General: Normal range of motion.      Cervical back: Normal range of motion.   Skin:     General: Skin is warm and dry.   Neurological:      General: No focal deficit present.      Mental Status: He is alert and oriented to person, place, and time.   Psychiatric:         Mood and Affect: Mood normal.         Behavior: Behavior normal.         Vital Signs:   /88 (BP Location: Left arm, Patient Position: Sitting, Cuff Size: Large Adult)   Pulse 114   Temp 98.6 °F (37 °C) (Temporal)   Resp 20   Ht 177.8 cm (70\")   Wt 107 kg (236 lb 9.6 oz)   SpO2 97%   BMI 33.95 kg/m²            PHQ-9 Score  PHQ-9 Total Score:      Lab Review  Office Visit on 01/23/2025   Component Date Value Ref Range Status    Rapid Influenza A Ag 01/23/2025 Negative  Negative Final    Rapid Influenza B Ag 01/23/2025 Negative  Negative Final    Internal Control 01/23/2025 Passed  Passed Final    Lot Number 01/23/2025 [id number]   Final    Expiration Date 01/23/2025 12/14/2025   Final    SARS Antigen 01/23/2025 Not Detected  Not Detected, Presumptive Negative Final    Internal Control 01/23/2025 Passed  Passed Final    Lot Number 01/23/2025 [id number]   Final    Expiration Date 01/23/2025 06/10/2025   Final    "     Radiology Results        Assessment / Plan         Assessment and Plan   Diagnoses and all orders for this visit:    1. Acute non-recurrent maxillary sinusitis (Primary)  Assessment & Plan:  This is likely a viral infection that progressed to a sinus infection now.  Plan to treat with Augmentin 875/125 mg p.o. twice daily x 7 days.  Also prescribed Tessalon Perles for cough.  Advised the patient to return to the clinic if his symptoms do not improve in a few days.    Orders:  -     benzonatate (Tessalon Perles) 100 MG capsule; Take 1 capsule by mouth 3 (Three) Times a Day As Needed for Cough.  Dispense: 30 capsule; Refill: 0  -     amoxicillin-clavulanate (AUGMENTIN) 875-125 MG per tablet; Take 1 tablet by mouth 2 (Two) Times a Day.  Dispense: 14 tablet; Refill: 0    2. Cough, unspecified type  -     POC Influenza A / B  -     POCT SARS-CoV-2 Antigen                Health Maintenance  Health Maintenance:   Health Maintenance Due   Topic Date Due    TDAP/TD VACCINES (1 - Tdap) Never done    HEPATITIS C SCREENING  Never done    LIPID PANEL  03/02/2017        Meds ordered during this visit  New Medications Ordered This Visit   Medications    benzonatate (Tessalon Perles) 100 MG capsule     Sig: Take 1 capsule by mouth 3 (Three) Times a Day As Needed for Cough.     Dispense:  30 capsule     Refill:  0    amoxicillin-clavulanate (AUGMENTIN) 875-125 MG per tablet     Sig: Take 1 tablet by mouth 2 (Two) Times a Day.     Dispense:  14 tablet     Refill:  0       Meds stopped during this visit:  Medications Discontinued During This Encounter   Medication Reason    diphenhydrAMINE (BENADRYL) 25 mg capsule *Therapy completed        Patient was given instructions and counseling regarding his condition or for health maintenance advice. Please see specific information pulled into the AVS if appropriate.     Follow Up   No follow-ups on file.    Reina Gonzáles DO  [facility] [place]     Dictated Utilizing  Dragon Dictation: Part of this note may be an electronic transcription/translation of spoken language to printed text using the Dragon Dictation System.    This document has been electronically signed by Reina Gonzáles DO   January 23, 2025 08:45 EST

## 2025-01-28 DIAGNOSIS — J01.00 ACUTE NON-RECURRENT MAXILLARY SINUSITIS: ICD-10-CM

## 2025-01-28 RX ORDER — BENZONATATE 100 MG/1
100 CAPSULE ORAL 3 TIMES DAILY PRN
Qty: 30 CAPSULE | Refills: 0 | Status: SHIPPED | OUTPATIENT
Start: 2025-01-28

## 2025-01-28 NOTE — TELEPHONE ENCOUNTER
Caller: Olivier Trevino    Relationship: Self    Best call back number: [phone]     Requested Prescriptions:   Requested Prescriptions     Pending Prescriptions Disp Refills    amoxicillin-clavulanate (AUGMENTIN) 875-125 MG per tablet 14 tablet 0     Sig: Take 1 tablet by mouth 2 (Two) Times a Day.    benzonatate (Tessalon Perles) 100 MG capsule 30 capsule 0     Sig: Take 1 capsule by mouth 3 (Three) Times a Day As Needed for Cough.      PATIENT WAS SEEN ON 1/23/25 AND WAS PRESCRIBED THE ABOVE MEDICATIONS. TOMORROW IS THE LAST DAY AND HE SAID THEY ARE HELPING BUT SLOWLY AND REQUESTING ANOTHER ROUND. CALL PATIENT TO DISCUSS IF ANY ISSUES      Pharmacy where request should be sent: [facility] #[id number] [place], [place] - [address] AT [facility] [address] - [phone]  - [phone] FX     Last office visit with prescribing clinician: 9/13/2024   Last telemedicine visit with prescribing clinician: Visit date not found   Next office visit with prescribing clinician: Visit date not found     Additional details provided by patient: PATIENT WAS SEEN ON 1/23/25 AND WAS PRESCRIBED THE ABOVE MEDICATIONS. TOMORROW IS THE LAST DAY AND HE SAID THEY ARE HELPING BUT SLOWLY AND REQUESTING ANOTHER ROUND. CALL PATIENT TO DISCUSS IF ANY ISSUES    Does the patient have less than a 3 day supply:  [x] Yes  [] No    Would you like a call back once the refill request has been completed: [] Yes [x] No    If the office needs to give you a call back, can they leave a voicemail: [x] Yes [] No    Manjit Magana Rep   01/28/25 09:15 EST

## 2025-04-29 DIAGNOSIS — F41.0 PANIC ATTACK: ICD-10-CM

## 2025-04-29 RX ORDER — HYDROXYZINE HYDROCHLORIDE 25 MG/1
TABLET, FILM COATED ORAL
Qty: 60 TABLET | Refills: 5 | Status: SHIPPED | OUTPATIENT
Start: 2025-04-29

## 2025-08-15 DIAGNOSIS — F41.0 PANIC ATTACK: ICD-10-CM
